# Patient Record
Sex: MALE | Race: WHITE
[De-identification: names, ages, dates, MRNs, and addresses within clinical notes are randomized per-mention and may not be internally consistent; named-entity substitution may affect disease eponyms.]

---

## 2018-04-30 ENCOUNTER — HOSPITAL ENCOUNTER (EMERGENCY)
Dept: HOSPITAL 25 - UCCORT | Age: 43
Discharge: HOME | End: 2018-04-30
Payer: COMMERCIAL

## 2018-04-30 VITALS — SYSTOLIC BLOOD PRESSURE: 122 MMHG | DIASTOLIC BLOOD PRESSURE: 80 MMHG

## 2018-04-30 DIAGNOSIS — A49.3: ICD-10-CM

## 2018-04-30 DIAGNOSIS — Z88.5: ICD-10-CM

## 2018-04-30 DIAGNOSIS — F17.210: ICD-10-CM

## 2018-04-30 DIAGNOSIS — H73.011: Primary | ICD-10-CM

## 2018-04-30 PROCEDURE — G0463 HOSPITAL OUTPT CLINIC VISIT: HCPCS

## 2018-04-30 PROCEDURE — 99212 OFFICE O/P EST SF 10 MIN: CPT

## 2018-04-30 NOTE — ED
Throat Pain/Nasal Congestion





- HPI Summary


HPI Summary: 





42 yr old male with the complaint of runny nose, post nasal drip, cough, sore 

throat, mild hoarse voice, and onset 5 days ago. 





- History of Current Complaint


Chief Complaint: UCGeneralIllness





- Allergies/Home Medications


Allergies/Adverse Reactions: 


 Allergies











Allergy/AdvReac Type Severity Reaction Status Date / Time


 


morphine Allergy  Nausea And Verified 04/30/18 08:32





   Vomiting  











Home Medications: 


 Home Medications





Acetaminophen [Acetaminophen Extra Strength] 500 mg PO Q6HR 04/30/18 [History 

Confirmed 04/30/18]


Guaifenesin/Dextromethorphan [Cough & Chest Congest Dm Liq] 15 ml PO Q12HR 04/30 /18 [History Confirmed 04/30/18]











PMH/Surg Hx/FS Hx/Imm Hx





- Surgical History


Surgery Procedure, Year, and Place: L knee 2000, L foot 1991, R wrist 2005-6


Infectious Disease History: No


Infectious Disease History: 


   Denies: Traveled Outside the [place] in Last 30 Days





- Family History


Known Family History: Positive: None





- Social History


Occupation: Employed Full-time


Alcohol Use: None


Substance Use Type: Reports: None


Smoking Status (MU): Light Every Day Tobacco Smoker


Type: Cigarettes


Amount Used/How Often: 6 cigs per day


Length of Time of Smoking/Using Tobacco: 20 years





Review of Systems


Constitutional: Negative


Positive: Sore Throat, Nasal Discharge


Positive: Cough


Positive: Other - back ache


All Other Systems Reviewed And Are Negative: Yes





Physical Exam


Triage Information Reviewed: Yes


Vital Signs On Initial Exam: 


 Initial Vitals











Temp Pulse Resp BP Pulse Ox


 


 99.7 F   88   20   122/80   96 


 


 04/30/18 08:28  04/30/18 08:28  04/30/18 08:28  04/30/18 08:28  04/30/18 08:28











Vital Signs Reviewed: Yes


Appearance: Positive: Well-Appearing, No Pain Distress


Skin: Positive: Warm, Skin Color Reflects Adequate Perfusion


Head/Face: Positive: Normal Head/Face Inspection


Eyes: Positive: EOMI


ENT: Positive: Normal ENT inspection, Pharyngeal erythema, Nasal congestion, TM 

red - right with scarlet appearing anterior TM, Hoarse voice - mild.  Negative: 

Tonsillar swelling, Tonsillar exudate, Muffled voice


Neck: Positive: Supple, Nontender


Respiratory/Lung Sounds: Positive: Clear to Auscultation, Breath Sounds Present


Cardiovascular: Positive: RRR.  Negative: Murmur


Abdomen Description: Positive: Nontender


Musculoskeletal: Positive: Strength/ROM Intact


Neurological: Positive: Sensory/Motor Intact, Alert, Oriented to Person Place, 

Time, CN Intact II-III, Normal Gait, Speech Normal


Psychiatric: Positive: Normal





- [place] Coma Scale


Best Eye Response: 4 - Spontaneous


Best Motor Response: 6 - Obeys Commands


Best Verbal Response: 5 - Oriented


Coma Scale Total: 15





Diagnostics





- Vital Signs


 Vital Signs











  Temp Pulse Resp BP Pulse Ox


 


 04/30/18 08:28  99.7 F  88  20  122/80  96














- Laboratory


Lab Statement: Any lab studies that have been ordered have been reviewed, and 

results considered in the medical decision making process.





EENT Course/Dx





- Course


Course Of Treatment: 42 yr old male with RIght OM, and likely mycoplasma given 

the URI symptoms plus the TM appearance.  Will Rx with Zithromax.





- Diagnoses


Provider Diagnoses: 


 Infection, mycoplasma, Bullous myringitis of right ear








Discharge





- Sign-Out/Discharge


Documenting (check all that apply): Discharge/Admit/Transfer





- Discharge Plan


Condition: Good


Disposition: HOME


Prescriptions: 


Azithromycin TAB* [Zithromax TAB (Z-DAVID) 250 mg #6 tabs] 2 tab PO .TODAY, THEN 

1 DAILY #1 david


Patient Education Materials:  Ear Infection (ED), Upper Respiratory Infection (

ED)


Forms:  *Work Release


Referrals: 


Aspen Coulter MD [Primary Care Provider] - 2 Days





- Billing Disposition and Condition


Condition: GOOD


Disposition: HOME

## 2019-09-26 ENCOUNTER — HOSPITAL ENCOUNTER (EMERGENCY)
Dept: HOSPITAL 25 - UCCORT | Age: 44
Discharge: HOME | End: 2019-09-26
Payer: COMMERCIAL

## 2019-09-26 VITALS — SYSTOLIC BLOOD PRESSURE: 118 MMHG | DIASTOLIC BLOOD PRESSURE: 73 MMHG

## 2019-09-26 DIAGNOSIS — F17.210: ICD-10-CM

## 2019-09-26 DIAGNOSIS — Z88.5: ICD-10-CM

## 2019-09-26 DIAGNOSIS — J20.9: Primary | ICD-10-CM

## 2019-09-26 PROCEDURE — G0463 HOSPITAL OUTPT CLINIC VISIT: HCPCS

## 2019-09-26 PROCEDURE — 99212 OFFICE O/P EST SF 10 MIN: CPT

## 2019-09-26 NOTE — UC
Throat Pain/Nasal Paul HPI





- HPI Summary


HPI Summary: 


44-year-old male presents with one-week history of harsh productive cough.  

States he initially had some subjective fever and chills.  States cough started 

to improve couple days ago and then yesterday began to worsen again.  Reports 

cough is productive for clear sputum.  Has some shortness of breath and sore 

throat with coughing.  States today he had one episode of posttussive emesis 

with a coughing fit. Patient smokes 1/2-1 pack per day.  Denies nasal congestion

, runny nose, dysphagia, chest pain, palpitations, wheezing, abdominal pain, or 

nausea.








- History of Current Complaint


Chief Complaint: UCGeneralIllness


Stated Complaint: COUGH


Time Seen by Provider: 09/26/19 12:50


Hx Obtained From: Patient


Pain Intensity: 0





- Allergies/Home Medications


Allergies/Adverse Reactions: 


 Allergies











Allergy/AdvReac Type Severity Reaction Status Date / Time


 


morphine Allergy  Nausea And Verified 04/30/18 08:32





   Vomiting  














PMH/Surg Hx/FS Hx/Imm Hx


Previously Healthy: Yes - Denies significant PMH





- Surgical History


Surgical History: None


Surgery Procedure, Year, and Place: L knee 2000, L foot 1991, R wrist 2005-6





- Family History


Known Family History: Positive: Non-Contributory





- Social History


Occupation: Employed Full-time


Lives: With Family


Alcohol Use: None


Substance Use Type: None


Smoking Status (MU): Light Every Day Tobacco Smoker


Type: Cigarettes


Amount Used/How Often: 6 cigs per day


Length of Time of Smoking/Using Tobacco: 20 years


Household Exposure Type: Cigarettes





Review of Systems


All Other Systems Reviewed And Are Negative: Yes


Constitutional: Positive: Fever, Chills


Skin: Negative: Rash


Eyes: Negative: Drainage, Eye Redness


ENT: Positive: Sore Throat.  Negative: Ear Ache, Nasal Discharge, Sinus 

Congestion, Sinus Pain/Tenderness


Respiratory: Positive: Shortness Of Breath, Cough


Cardiovascular: Negative: Palpitations, Chest Pain


Gastrointestinal: Positive: Vomiting.  Negative: Abdominal Pain, Nausea


Genitourinary: Positive: Negative


Musculoskeletal: Positive: Negative


Neurological: Positive: Negative


Is Patient Immunocompromised?: No





Physical Exam





- Summary


Physical Exam Summary: 


GENERAL APPEARANCE: Well developed, well nourished, alert and cooperative, and 

appears to be in no acute distress.





EYES: Conjunctiva clear. No drainage. PERRL, EOM intact. Vision is grossly 

intact.





EARS: External auditory canals and tympanic membranes clear, hearing grossly 

intact.





NOSE: No nasal discharge.





THROAT: Mild pharyngeal erythema. No tonsilar inflammation, swelling, exudate, 

or lesions. Uvula midline.





NECK: Neck supple, non-tender without lymphadenopathy.





CARDIAC: Normal S1 and S2. No S3, S4 or murmurs. Rhythm is regular. There is no 

peripheral edema, cyanosis or pallor. Extremities are warm and well perfused. 

Capillary refill is less than 2 seconds. Peripheral pulses intact.





LUNGS: Clear to auscultation without rales, rhonchi, wheezing or diminished 

breath sounds. Dry, harsh, non-productive cough.





ABDOMEN: Positive bowel sounds. Soft, nondistended, nontender. No guarding or 

rebound. No masses or hepatosplenomegally.





MUSKULOSKELETAL: ROM intact to all extremities. No joint erythema or 

tenderness. Normal muscular development. Normal gait.





SKIN: Skin normal color, texture and turgor with no lesions or eruptions.





Triage Information Reviewed: Yes


Vital Signs: 


 Initial Vital Signs











Temp  99.4 F   09/26/19 12:42


 


Pulse  74   09/26/19 12:42


 


Resp  16   09/26/19 12:42


 


BP  118/73   09/26/19 12:42


 


Pulse Ox  97   09/26/19 12:42











Vital Signs Reviewed: Yes





Throat Pain/Nasal Course/Dx





- Course


Course Of Treatment: 


44-year-old male presents with one-week history of harsh productive cough.  

States he initially had some subjective fever and chills.  States cough started 

to improve couple days ago and then yesterday began to worsen again.  Reports 

cough is productive for clear sputum.  Has some shortness of breath and sore 

throat with coughing.  States today he had one episode of posttussive emesis 

with a coughing fit. Patient smokes 1/2-1 pack per day.  Denies nasal congestion

, runny nose, dysphagia, chest pain, palpitations, wheezing, abdominal pain, or 

nausea.  Afebrile.  Vital signs stable.  Patient had mild pharyngeal erythema 

without tonsillar swelling or exudate, no cervical lymphadenopathy, clear 

bilateral breath sounds, dry, harsh nonproductive cough, and otherwise 

unremarkable exam.  Discussed with patient that his symptoms were consistent 

with a cute bronchitis which was likely viral although with the improving and 

then worsening of symptoms cannot rule out the possibility of a secondary 

bacterial infection.  We discussed the risks and benefits of treating with an 

antibiotic and the patient is electing to start at this time.  Will treat with 

a course of azithromycin and provided him with Tessalon Perles one capsule 

every 8 hours as needed for cough.  He is to follow-up with his primary care 

provider in 3-5 days if symptoms are not improving.  Anticipatory guidance and 

warning symptoms are reviewed with the patient.  Verbalizes understanding and 

agrees with plan of care.








- Differential Dx/Diagnosis


Differential Diagnosis/HQI/PQRI: Influenza, Pharyngitis, Sinusitis, Tonsillitis

, URI, Other - Pneumonia, bronchitis


Provider Diagnosis: 


 Acute bronchitis








Discharge ED





- Sign-Out/Discharge


Documenting (check all that apply): Patient Departure


All imaging exams completed and their final reports reviewed: No Studies





- Discharge Plan


Condition: Stable


Disposition: HOME


Prescriptions: 


Azithromyxin SEBASTIAN (NF) [Z-Sebastian (Zithromax) 250 mg tabs #6] 2 tab PO .TODAY, THEN 

1 DAILY #6 tab


Benzonatate CAP* [Tessalon 100 MG CAP*] 100 mg PO TID PRN #21 cap


 PRN Reason: Cough


Patient Education Materials:  Acute Bronchitis (ED)


Forms:  *Work Release


Referrals: 


Nereyda Crabtree NP [Primary Care Provider] - 3 Days


Additional Instructions: 


Your history and exam are consistent with acute bronchitis.





Start azithromycin 2 tabs today then 1 tab a day for next 4 days.





Be aware that the cough with bronchitis may persist for 2-3 weeks even if other 

symptoms have improved.





Get plenty of rest.





Drink plenty of fluids.





Run a cool mist humidifer in your room at night.





Take over the counter acetaminophen (Tylenol) or ibuprofen (Advil, Motrin) 

according to directions as needed for pain or fever.





Take Tessalon Perles 1 cap every 8 hours as needed for cough.





Follow up with your primary care provider in 3-5 days if symptoms do not 

improve.





Seek immediate medical attention in the emergency room if you have fever 

greater than 100.5 F despite taking acetaminophen or ibuprofen, have chest pain

, difficulty breathing, or have any worsening of symptoms.





- Billing Disposition and Condition


Condition: STABLE


Disposition: Home





- Attestation Statements


Provider Attestation: 





I was available for consult. This patient was seen by the NELL. The patient was 

not presented to, seen by, or examined by me. -Bakari